# Patient Record
Sex: MALE | Race: WHITE | NOT HISPANIC OR LATINO | URBAN - NONMETROPOLITAN AREA
[De-identification: names, ages, dates, MRNs, and addresses within clinical notes are randomized per-mention and may not be internally consistent; named-entity substitution may affect disease eponyms.]

---

## 2019-02-28 NOTE — PATIENT DISCUSSION
New Prescription: erythromycin (erythromycin): ointment: 5 mg/gram (0.5 %) a small amount at bedtime on eyelid 02-

## 2019-02-28 NOTE — PATIENT DISCUSSION
Stopped Today: erythromycin (erythromycin): ointment: 5 mg/gram (0.5 %) a small amount twice a day into affected eye 09-

## 2019-04-19 ENCOUNTER — IMPORTED ENCOUNTER (OUTPATIENT)
Dept: URBAN - NONMETROPOLITAN AREA CLINIC 1 | Facility: CLINIC | Age: 54
End: 2019-04-19

## 2019-04-19 PROBLEM — H52.03: Noted: 2019-04-19

## 2019-04-19 PROBLEM — H52.4: Noted: 2019-04-19

## 2019-04-19 PROCEDURE — 92004 COMPRE OPH EXAM NEW PT 1/>: CPT

## 2019-04-19 PROCEDURE — 92015 DETERMINE REFRACTIVE STATE: CPT

## 2019-04-19 NOTE — PATIENT DISCUSSION
Hyperopia-Discussed diagnosis with patient. Presbyopia-Discussed diagnosis with patient. Updated spec Rx given. Recommend lens that will provide comfort as well as protect safety and health of eyes.

## 2019-08-29 NOTE — PATIENT DISCUSSION
Continue: erythromycin (erythromycin): ointment: 5 mg/gram (0.5 %) a small amount at bedtime on eyelid 02-

## 2020-02-27 NOTE — PATIENT DISCUSSION
CATARACTS, OU - VISUALLY SIGNIFICANT. PT TO CALL WHEN READY TO PROCEED WITH SURGERY. SCHEDULE OD  FIRST THEN LATER IN OS IF VISUAL SYMPTOMS PERSIST.

## 2020-02-27 NOTE — PATIENT DISCUSSION
Stopped Today: erythromycin (erythromycin): ointment: 5 mg/gram (0.5 %) a small amount at bedtime on eyelid 02-

## 2020-06-06 ENCOUNTER — IMPORTED ENCOUNTER (OUTPATIENT)
Dept: URBAN - NONMETROPOLITAN AREA CLINIC 1 | Facility: CLINIC | Age: 55
End: 2020-06-06

## 2020-06-06 PROCEDURE — 92014 COMPRE OPH EXAM EST PT 1/>: CPT

## 2020-06-06 PROCEDURE — 92015 DETERMINE REFRACTIVE STATE: CPT

## 2020-06-09 NOTE — PATIENT DISCUSSION
Surgery Counseling: I have discussed the option of scheduling surgery versus following, as well as the risks, benefits and alternatives of cataract surgery with the patient. It was explained that the surgery is medically indicated at this time, and it can be performed at the patient's option as delaying will cause no further deterioration, therefore there is no rush and there is no harm in waiting to have surgery. It was also explained that there is no guarantee that removing the cataract will improve their vision. The patient understands and desires to proceed with cataract surgery with the implantation of an intraocular lens to improve vision for _____reading___________. I have given the patient the prescribed regimen of the all-in-one drop to use before and after cataract surgery. They have elected to use the all-in-one option of Pred/Gati/Brom(prednisolone acetate,gatifloxacin,and bromfenac. Patient to administer as directed.

## 2020-06-09 NOTE — PATIENT DISCUSSION
CATARACTS, OU - VISUALLY SIGNIFICANT. SCHEDULE _os_ FIRST THEN LATER IN _od_ DISCUSSED OPTION OF ___STANDARD OU____VS __STANDARD/MAN LRI OU_____. PATIENT UNDERSTANDS AND DESIRES _______TO LET US KNOW_________.

## 2020-07-28 NOTE — PATIENT DISCUSSION
Pre-Op 2nd Eye Counseling: The patient has noticed an improvement in their visual symptoms in the operative eye. The patient complains of decreased vision in the fellow eye when _______WATCHING TV___. It was explained to the patient that the decision to proceed with cataract surgery in the fellow eye is entirely a separate decision from the surgical eye. All of the same risks, benefits and alternatives are reviewed with the patient again. The patient does feel the vision in the non-operative eye is limiting their daily activities and elects to proceed with cataract surgery in the __RIGHT_____ eye. . I have given the patient the prescribed regimen of  drops to use before and after cataract surgery. Patient to administer as directed.

## 2020-07-28 NOTE — PATIENT DISCUSSION
S/P PE IOL, __OS_. DOING WELL. CONTINUE PRED-GATI-BROM IN THE SURGICAL EYE  FOR A TOTAL OF 3 WEEKS USE THEN DISCONTINUE. PATIENT DESIRES _STANDARD______IOL FOR 2ND EYE. SCHEDULE CATARACT SURGERY.

## 2021-06-23 NOTE — PATIENT DISCUSSION
The patient has blepharitis. Blepharitis is a common chronic inflammation of the eyelids. I instructed the patient to apply warm compresses and to scrub the base of the eyelashes daily with a commercially available eyelid cleaning pad or diluted baby shampoo as described in the direction sheet given to the patient. This treatment may need to be continued indefinitely. In addition, I recommended a topical antibiotic/steroid ointment to be applied to the eyelids for the next several days. I emphasized the risks of this including infection, glaucoma, and cataracts and the need to stop this medicine as directed.

## 2021-07-30 ENCOUNTER — IMPORTED ENCOUNTER (OUTPATIENT)
Dept: URBAN - NONMETROPOLITAN AREA CLINIC 1 | Facility: CLINIC | Age: 56
End: 2021-07-30

## 2021-07-30 PROCEDURE — 92014 COMPRE OPH EXAM EST PT 1/>: CPT

## 2021-07-30 PROCEDURE — 92015 DETERMINE REFRACTIVE STATE: CPT

## 2021-08-23 ENCOUNTER — IMPORTED ENCOUNTER (OUTPATIENT)
Dept: URBAN - NONMETROPOLITAN AREA CLINIC 1 | Facility: CLINIC | Age: 56
End: 2021-08-23

## 2021-08-23 NOTE — PATIENT DISCUSSION
RX CHECKRX GIVENHyperopia-Discussed diagnosis with patient. Presbyopia-Discussed diagnosis with patient. Updated spec Rx given. Recommend lens that will provide comfort as well as protect safety and health of eyes.

## 2022-04-10 ASSESSMENT — TONOMETRY
OS_IOP_MMHG: 16
OD_IOP_MMHG: 16
OS_IOP_MMHG: 14
OS_IOP_MMHG: 14
OD_IOP_MMHG: 14
OD_IOP_MMHG: 14

## 2022-04-10 ASSESSMENT — VISUAL ACUITY
OD_CC: J1
OU_CC: 20/20
OS_CC: 20/50
OD_CC: 20/25-1
OD_SC: 20/20
OS_CC: J1+
OS_CC: J1
OD_SC: 20/20
OD_CC: J1+
OS_SC: 20/20
OS_PH: 20/20
OS_SC: 20/20